# Patient Record
Sex: FEMALE | Race: WHITE | URBAN - METROPOLITAN AREA
[De-identification: names, ages, dates, MRNs, and addresses within clinical notes are randomized per-mention and may not be internally consistent; named-entity substitution may affect disease eponyms.]

---

## 2017-02-16 ENCOUNTER — IMPORTED ENCOUNTER (OUTPATIENT)
Dept: URBAN - METROPOLITAN AREA CLINIC 50 | Facility: CLINIC | Age: 77
End: 2017-02-16

## 2017-05-25 ENCOUNTER — IMPORTED ENCOUNTER (OUTPATIENT)
Dept: URBAN - METROPOLITAN AREA CLINIC 50 | Facility: CLINIC | Age: 77
End: 2017-05-25

## 2017-06-15 ENCOUNTER — IMPORTED ENCOUNTER (OUTPATIENT)
Dept: URBAN - METROPOLITAN AREA CLINIC 50 | Facility: CLINIC | Age: 77
End: 2017-06-15

## 2017-06-23 ENCOUNTER — IMPORTED ENCOUNTER (OUTPATIENT)
Dept: URBAN - METROPOLITAN AREA CLINIC 50 | Facility: CLINIC | Age: 77
End: 2017-06-23

## 2017-09-07 ENCOUNTER — IMPORTED ENCOUNTER (OUTPATIENT)
Dept: URBAN - METROPOLITAN AREA CLINIC 50 | Facility: CLINIC | Age: 77
End: 2017-09-07

## 2017-10-19 ENCOUNTER — IMPORTED ENCOUNTER (OUTPATIENT)
Dept: URBAN - METROPOLITAN AREA CLINIC 50 | Facility: CLINIC | Age: 77
End: 2017-10-19

## 2018-02-15 ENCOUNTER — IMPORTED ENCOUNTER (OUTPATIENT)
Dept: URBAN - METROPOLITAN AREA CLINIC 50 | Facility: CLINIC | Age: 78
End: 2018-02-15

## 2018-05-30 ENCOUNTER — IMPORTED ENCOUNTER (OUTPATIENT)
Dept: URBAN - METROPOLITAN AREA CLINIC 50 | Facility: CLINIC | Age: 78
End: 2018-05-30

## 2018-06-07 ENCOUNTER — IMPORTED ENCOUNTER (OUTPATIENT)
Dept: URBAN - METROPOLITAN AREA CLINIC 50 | Facility: CLINIC | Age: 78
End: 2018-06-07

## 2018-10-11 ENCOUNTER — IMPORTED ENCOUNTER (OUTPATIENT)
Dept: URBAN - METROPOLITAN AREA CLINIC 50 | Facility: CLINIC | Age: 78
End: 2018-10-11

## 2018-12-04 ENCOUNTER — IMPORTED ENCOUNTER (OUTPATIENT)
Dept: URBAN - METROPOLITAN AREA CLINIC 50 | Facility: CLINIC | Age: 78
End: 2018-12-04

## 2019-02-21 ENCOUNTER — IMPORTED ENCOUNTER (OUTPATIENT)
Dept: URBAN - METROPOLITAN AREA CLINIC 50 | Facility: CLINIC | Age: 79
End: 2019-02-21

## 2019-05-24 ENCOUNTER — IMPORTED ENCOUNTER (OUTPATIENT)
Dept: URBAN - METROPOLITAN AREA CLINIC 50 | Facility: CLINIC | Age: 79
End: 2019-05-24

## 2019-05-30 ENCOUNTER — IMPORTED ENCOUNTER (OUTPATIENT)
Dept: URBAN - METROPOLITAN AREA CLINIC 50 | Facility: CLINIC | Age: 79
End: 2019-05-30

## 2019-11-14 ENCOUNTER — IMPORTED ENCOUNTER (OUTPATIENT)
Dept: URBAN - METROPOLITAN AREA CLINIC 50 | Facility: CLINIC | Age: 79
End: 2019-11-14

## 2020-03-26 ENCOUNTER — IMPORTED ENCOUNTER (OUTPATIENT)
Dept: URBAN - METROPOLITAN AREA CLINIC 50 | Facility: CLINIC | Age: 80
End: 2020-03-26

## 2020-06-04 NOTE — PATIENT DISCUSSION
CATARACT, OU - VISUALLY SIGNIFICANT. SCHEDULE PHACO WITH IOL OD FIRST THEN  OS IF VISUAL SYMPTOMS PERSIST. GLASSES RX GIVEN TO FILL IF DESIRES IN THE EVENT PATIENT DOES NOT PROCEED WITH SURGERY. SPOKE WITH PT ABOUT RECOMMENDATION FOR AVT. EXPLAINED THAT SHE WOULD MOST LIKELY NOT BE A GOOD CANDIDATE FOR A MF IOL.

## 2020-06-26 ENCOUNTER — IMPORTED ENCOUNTER (OUTPATIENT)
Dept: URBAN - METROPOLITAN AREA CLINIC 50 | Facility: CLINIC | Age: 80
End: 2020-06-26

## 2020-07-02 ENCOUNTER — IMPORTED ENCOUNTER (OUTPATIENT)
Dept: URBAN - METROPOLITAN AREA CLINIC 50 | Facility: CLINIC | Age: 80
End: 2020-07-02

## 2020-07-21 NOTE — PATIENT DISCUSSION
New Prescription: Pred Forte (prednisolone acetate): drops,suspension: 1% 1 drop three times a day as directed into right eye 07-

## 2020-07-21 NOTE — PATIENT DISCUSSION
New Prescription: moxifloxacin (moxifloxacin): drops: 0.5% 1 drop three times a day as directed into right eye 07-

## 2020-07-21 NOTE — PATIENT DISCUSSION
New Prescription: Prolensa (bromfenac): drops: 0.07% 1 drop every morning as directed into right eye 07-

## 2020-07-29 NOTE — PATIENT DISCUSSION
Continue: Pred Forte (prednisolone acetate): drops,suspension: 1% 1 drop three times a day as directed into both eyes 07-

## 2020-07-29 NOTE — PATIENT DISCUSSION
Discontinue in right eye 8/5/20. Start 2 days prior to surgery in left eye and continue as directed.

## 2020-07-29 NOTE — PATIENT DISCUSSION
New Prescription: Laurita Hale (brinzolamide-brimonidine): drops,suspension: 1-0.2% 1 drop twice a day as directed into both eyes 07-

## 2020-07-29 NOTE — PATIENT DISCUSSION
*Pre-Op 2nd Eye Counseling: The patient has noticed an improvement in their visual symptoms in the operative eye. The patient complains of decreased vision in the fellow eye when watching TV and walking around. It was explained to the patient that the decision to proceed with cataract surgery in the fellow eye is entirely a separate decision from the surgical eye. All of the same risks, benefits and alternatives ere reviewed with the patient again. The patient does feel the vision in the non-operative eye is limiting their daily activities and elects to proceed with surgery in the cataract eye.

## 2020-07-29 NOTE — PATIENT DISCUSSION
Continue as directed in right eye. Start 2 days prior to surgery in left eye and continue as directed.

## 2020-07-29 NOTE — PATIENT DISCUSSION
Continue: moxifloxacin (moxifloxacin): drops: 0.5% 1 drop three times a day as directed into both eyes 07-

## 2020-07-29 NOTE — PATIENT DISCUSSION
S/P PHACO W/IOL,OD:  ELEVATED IOP. ADD SIMBRINZA TO CONTROL IOP BID OD X 7 DAYS. CONTINUE TO TAPER DROPS AS DIRECTED. DISCONTINUE ANTIBIOTIC IN 1 WEEK. RETURN IN 1 WEEK FOR AR/MR TO CHECK REFRACTION STABALIZATION PRIOR TO PROCEEDING WITH 2ND EYE. IF HAPPY WITH VISION, OK TO PROCEED.

## 2020-08-11 NOTE — PATIENT DISCUSSION
Continue: moxifloxacin (moxifloxacin): drops: 0.5% 1 drop three times a day as directed into left eye 07-

## 2020-08-11 NOTE — PATIENT DISCUSSION
Continue: Pred Forte (prednisolone acetate): drops,suspension: 1% 1 drop as directed into both eyes 07-

## 2020-08-19 NOTE — PATIENT DISCUSSION
S/P PHACO W/IOL,OS: FLUID RELEASED FROM AB EXTERNA INCISION AT SLIT LAMP WITHOUT DIFFICULTY. REPEAT IOP CHECK BY DR. PALMA (18 mmhg). INSTILLED ONE DROP OF ANTIBIOTIC IMMEDIATELY. ADD SIMBRINZA BID OS ONLY TO CONTROL IOP USE FOR 7 DAYS THEN D/C.  CONTINUE TO TAPER DROPS AS DIRECTED. DISCONTINUE ANTIBIOTIC DROP IN 1 WEEK. RETURN FOR FOLLOW-UP AS SCHEDULED.

## 2020-08-19 NOTE — PATIENT DISCUSSION
New Prescription: Manohar Baptiste (brinzolamide-brimonidine): drops,suspension: 1-0.2% 1 drop twice a day as directed into left eye 08-

## 2020-09-01 NOTE — PATIENT DISCUSSION
S/P PC IOL, OU. DOING WELL. CONTINUE DROPS AS DIRECTED. ATS PRN. SPECS RX OFFERED. RX ARC IN SPECS TO MINIMIZE GLARE. RETURN FOR FOLLOW-UP AS SCHEDULED.

## 2020-09-01 NOTE — PATIENT DISCUSSION
Continue: Pred Forte (prednisolone acetate): drops,suspension: 1% 1 drop as directed into left eye 07-

## 2020-09-01 NOTE — PATIENT DISCUSSION
Stopped Today: Simbrinza (brinzolamide-brimonidine): drops,suspension: 1-0.2% 1 drop twice a day as directed into left eye 08-

## 2020-09-08 ENCOUNTER — IMPORTED ENCOUNTER (OUTPATIENT)
Dept: URBAN - METROPOLITAN AREA CLINIC 50 | Facility: CLINIC | Age: 80
End: 2020-09-08

## 2020-11-05 ENCOUNTER — IMPORTED ENCOUNTER (OUTPATIENT)
Dept: URBAN - METROPOLITAN AREA CLINIC 50 | Facility: CLINIC | Age: 80
End: 2020-11-05

## 2020-11-05 NOTE — PATIENT DISCUSSION
"""IOP OU controlled with current drops. Will continue to monitor.  Continue Timolol 0.5% both eyes ""

## 2021-03-04 ENCOUNTER — IMPORTED ENCOUNTER (OUTPATIENT)
Dept: URBAN - METROPOLITAN AREA CLINIC 50 | Facility: CLINIC | Age: 81
End: 2021-03-04

## 2021-03-04 NOTE — PATIENT DISCUSSION
"""IOP controlled OU. Schedule HVF/OCT OU in June. "" ""Continue Timolol 0.5% both eyes twice a day . """

## 2021-04-18 ASSESSMENT — TONOMETRY
OS_IOP_MMHG: 14
OD_IOP_MMHG: 16
OS_IOP_MMHG: 14
OS_IOP_MMHG: 15
OD_IOP_MMHG: 15
OS_IOP_MMHG: 14
OS_IOP_MMHG: 15
OD_IOP_MMHG: 23
OS_IOP_MMHG: 14
OD_IOP_MMHG: 18
OD_IOP_MMHG: 15
OS_IOP_MMHG: 14
OS_IOP_MMHG: 12
OD_IOP_MMHG: 17
OD_IOP_MMHG: 16
OD_IOP_MMHG: 14
OD_IOP_MMHG: 14
OD_IOP_MMHG: 13
OS_IOP_MMHG: 18
OD_IOP_MMHG: 18
OD_IOP_MMHG: 14
OS_IOP_MMHG: 16
OD_IOP_MMHG: 16
OS_IOP_MMHG: 16
OD_IOP_MMHG: 16
OS_IOP_MMHG: 14

## 2021-04-18 ASSESSMENT — VISUAL ACUITY
OD_CC: 20/25
OS_CC: 20/30
OD_PH: @ 14 IN
OD_CC: 20/25+2
OD_CC: J3@ 14 IN
OS_PH: 20/60-1
OS_CC: J1+@ 16 IN
OS_PH: @ 14 IN
OS_CC: 20/40+
OS_CC: 20/30-
OD_CC: J2@ 18 IN
OS_CC: J1
OD_CC: J1+
OD_CC: J1
OD_CC: 20/20-1
OD_CC: 20/20-
OD_CC: 20/20-1
OD_CC: 20/20
OS_CC: J1+
OD_CC: 20/20
OS_CC: 20/30-2
OS_CC: 20/30-
OS_CC: 20/30
OS_CC: J2@ 18 IN
OD_CC: 20/25
OD_CC: 20/20
OS_CC: J1+
OS_CC: 20/30-1
OS_CC: 20/80-1
OD_CC: 20/25
OD_CC: 20/25
OS_CC: 20/25-
OD_CC: J1+
OD_CC: 20/20
OD_CC: 20/20
OS_CC: 20/20-
OS_CC: 20/25
OS_CC: J3@ 14 IN
OS_CC: 20/25-2
OS_CC: J3@ 14 IN
OD_CC: J3@ 14 IN
OD_CC: J1+@ 16 IN
OS_CC: 20/30+1
OS_CC: 20/30-2
OD_CC: 20/20

## 2021-06-23 ENCOUNTER — PREPPED CHART (OUTPATIENT)
Dept: URBAN - METROPOLITAN AREA CLINIC 52 | Facility: CLINIC | Age: 81
End: 2021-06-23

## 2021-06-25 ENCOUNTER — DIAGNOSTICS ONLY (OUTPATIENT)
Dept: URBAN - METROPOLITAN AREA CLINIC 52 | Facility: CLINIC | Age: 81
End: 2021-06-25

## 2021-06-25 DIAGNOSIS — H40.023: ICD-10-CM

## 2021-06-25 PROCEDURE — 92133 CPTRZD OPH DX IMG PST SGM ON: CPT

## 2021-06-25 PROCEDURE — 92083 EXTENDED VISUAL FIELD XM: CPT

## 2021-07-06 ENCOUNTER — PREPPED CHART (OUTPATIENT)
Dept: URBAN - METROPOLITAN AREA CLINIC 52 | Facility: CLINIC | Age: 81
End: 2021-07-06

## 2021-07-08 ENCOUNTER — 4 MONTH FOLLOW-UP (OUTPATIENT)
Dept: URBAN - METROPOLITAN AREA CLINIC 52 | Facility: CLINIC | Age: 81
End: 2021-07-08

## 2021-07-08 DIAGNOSIS — H40.023: ICD-10-CM

## 2021-07-08 PROCEDURE — 92012 INTRM OPH EXAM EST PATIENT: CPT

## 2021-07-08 ASSESSMENT — VISUAL ACUITY
OS_CC: 20/30-1
OU_CC: J1+ @ 14IN
OD_CC: 20/20
OU_CC: 20/20

## 2021-07-08 ASSESSMENT — TONOMETRY
OS_IOP_MMHG: 15
OD_IOP_MMHG: 17

## 2021-11-11 ENCOUNTER — 6 MONTH COMPREHENSIVE EXAM (OUTPATIENT)
Dept: URBAN - METROPOLITAN AREA CLINIC 52 | Facility: CLINIC | Age: 81
End: 2021-11-11

## 2021-11-11 DIAGNOSIS — H35.3131: ICD-10-CM

## 2021-11-11 DIAGNOSIS — H35.373: ICD-10-CM

## 2021-11-11 DIAGNOSIS — H40.023: ICD-10-CM

## 2021-11-11 PROCEDURE — 92014 COMPRE OPH EXAM EST PT 1/>: CPT

## 2021-11-11 PROCEDURE — 92134 CPTRZ OPH DX IMG PST SGM RTA: CPT

## 2021-11-11 ASSESSMENT — VISUAL ACUITY
OD_CC: 20/20
OS_CC: 20/30
OU_CC: J1+

## 2021-11-11 ASSESSMENT — TONOMETRY
OD_IOP_MMHG: 16
OS_IOP_MMHG: 16

## 2022-06-02 ENCOUNTER — FOLLOW UP (OUTPATIENT)
Dept: URBAN - METROPOLITAN AREA CLINIC 52 | Facility: CLINIC | Age: 82
End: 2022-06-02

## 2022-06-02 DIAGNOSIS — H40.023: ICD-10-CM

## 2022-06-02 PROCEDURE — 92012 INTRM OPH EXAM EST PATIENT: CPT

## 2022-06-02 ASSESSMENT — TONOMETRY
OS_IOP_MMHG: 13
OD_IOP_MMHG: 17

## 2022-06-02 ASSESSMENT — VISUAL ACUITY
OD_CC: 20/20
OS_CC: 20/40-2

## 2022-06-20 ENCOUNTER — DIAGNOSTICS ONLY (OUTPATIENT)
Dept: URBAN - METROPOLITAN AREA CLINIC 52 | Facility: CLINIC | Age: 82
End: 2022-06-20

## 2022-06-20 DIAGNOSIS — H40.023: ICD-10-CM

## 2022-06-20 PROCEDURE — 92083 EXTENDED VISUAL FIELD XM: CPT

## 2022-06-20 PROCEDURE — 92133 CPTRZD OPH DX IMG PST SGM ON: CPT

## 2022-10-06 ENCOUNTER — FOLLOW UP (OUTPATIENT)
Dept: URBAN - METROPOLITAN AREA CLINIC 52 | Facility: CLINIC | Age: 82
End: 2022-10-06

## 2022-10-06 DIAGNOSIS — H40.023: ICD-10-CM

## 2022-10-06 PROCEDURE — 92012 INTRM OPH EXAM EST PATIENT: CPT

## 2022-10-06 ASSESSMENT — VISUAL ACUITY
OD_CC: 20/40
OS_CC: 20/60

## 2022-10-06 ASSESSMENT — TONOMETRY
OS_IOP_MMHG: 16
OD_IOP_MMHG: 16

## 2023-10-26 ENCOUNTER — FOLLOW UP (OUTPATIENT)
Dept: URBAN - METROPOLITAN AREA CLINIC 52 | Facility: CLINIC | Age: 83
End: 2023-10-26

## 2023-10-26 DIAGNOSIS — H40.023: ICD-10-CM

## 2023-10-26 PROCEDURE — 76514 ECHO EXAM OF EYE THICKNESS: CPT

## 2023-10-26 PROCEDURE — 92083 EXTENDED VISUAL FIELD XM: CPT

## 2023-10-26 PROCEDURE — 92012 INTRM OPH EXAM EST PATIENT: CPT

## 2023-10-26 PROCEDURE — 92133 CPTRZD OPH DX IMG PST SGM ON: CPT

## 2023-10-26 ASSESSMENT — TONOMETRY
OS_IOP_MMHG: 14
OS_IOP_MMHG: 13
OD_IOP_MMHG: 23
OD_IOP_MMHG: 24

## 2023-10-26 ASSESSMENT — VISUAL ACUITY
OD_PH: 20/25
OD_CC: 20/30
OU_CC: 20/30
OS_CC: 20/40

## 2023-10-26 ASSESSMENT — PACHYMETRY
OD_CT_UM: 557
OS_CT_UM: 541

## 2024-01-25 ENCOUNTER — COMPREHENSIVE EXAM (OUTPATIENT)
Dept: URBAN - METROPOLITAN AREA CLINIC 52 | Facility: CLINIC | Age: 84
End: 2024-01-25

## 2024-01-25 DIAGNOSIS — H52.4: ICD-10-CM

## 2024-01-25 DIAGNOSIS — H35.3131: ICD-10-CM

## 2024-01-25 DIAGNOSIS — H02.831: ICD-10-CM

## 2024-01-25 DIAGNOSIS — H02.834: ICD-10-CM

## 2024-01-25 DIAGNOSIS — H35.373: ICD-10-CM

## 2024-01-25 DIAGNOSIS — H40.023: ICD-10-CM

## 2024-01-25 PROCEDURE — 92015 DETERMINE REFRACTIVE STATE: CPT

## 2024-01-25 PROCEDURE — 92014 COMPRE OPH EXAM EST PT 1/>: CPT

## 2024-01-25 PROCEDURE — 92134 CPTRZ OPH DX IMG PST SGM RTA: CPT

## 2024-01-25 ASSESSMENT — TONOMETRY
OD_IOP_MMHG: 15
OS_IOP_MMHG: 14
OD_IOP_MMHG: 14
OS_IOP_MMHG: 15

## 2024-01-25 ASSESSMENT — VISUAL ACUITY
OD_CC: 20/40
OD_PH: 20/30

## 2024-05-16 ENCOUNTER — FOLLOW UP (OUTPATIENT)
Dept: URBAN - METROPOLITAN AREA CLINIC 52 | Facility: CLINIC | Age: 84
End: 2024-05-16

## 2024-05-16 DIAGNOSIS — H40.023: ICD-10-CM

## 2024-05-16 PROCEDURE — 99213 OFFICE O/P EST LOW 20 MIN: CPT

## 2024-05-16 ASSESSMENT — TONOMETRY
OS_IOP_MMHG: 11
OD_IOP_MMHG: 13
OD_IOP_MMHG: 12
OS_IOP_MMHG: 12

## 2024-05-16 ASSESSMENT — VISUAL ACUITY
OS_CC: 20/30-1
OD_CC: 20/20

## 2024-09-19 ENCOUNTER — FOLLOW UP (OUTPATIENT)
Dept: URBAN - METROPOLITAN AREA CLINIC 52 | Facility: CLINIC | Age: 84
End: 2024-09-19

## 2024-09-19 DIAGNOSIS — H40.023: ICD-10-CM

## 2024-09-19 PROCEDURE — 99213 OFFICE O/P EST LOW 20 MIN: CPT

## 2025-01-24 ENCOUNTER — DIAGNOSTICS ONLY (OUTPATIENT)
Age: 85
End: 2025-01-24

## 2025-01-24 DIAGNOSIS — H40.023: ICD-10-CM

## 2025-01-24 PROCEDURE — 92083 EXTENDED VISUAL FIELD XM: CPT

## 2025-01-24 PROCEDURE — 92133 CPTRZD OPH DX IMG PST SGM ON: CPT

## 2025-01-30 ENCOUNTER — COMPREHENSIVE EXAM (OUTPATIENT)
Age: 85
End: 2025-01-30

## 2025-01-30 DIAGNOSIS — H02.834: ICD-10-CM

## 2025-01-30 DIAGNOSIS — H52.4: ICD-10-CM

## 2025-01-30 DIAGNOSIS — H40.023: ICD-10-CM

## 2025-01-30 DIAGNOSIS — H16.223: ICD-10-CM

## 2025-01-30 DIAGNOSIS — H02.831: ICD-10-CM

## 2025-01-30 DIAGNOSIS — H35.373: ICD-10-CM

## 2025-01-30 DIAGNOSIS — H35.3131: ICD-10-CM

## 2025-01-30 PROCEDURE — 99214 OFFICE O/P EST MOD 30 MIN: CPT

## 2025-01-30 PROCEDURE — 92015 DETERMINE REFRACTIVE STATE: CPT

## 2025-01-30 PROCEDURE — 92134 CPTRZ OPH DX IMG PST SGM RTA: CPT

## 2025-05-21 ENCOUNTER — FOLLOW UP (OUTPATIENT)
Age: 85
End: 2025-05-21

## 2025-05-21 DIAGNOSIS — H35.373: ICD-10-CM

## 2025-05-21 DIAGNOSIS — H52.4: ICD-10-CM

## 2025-05-21 DIAGNOSIS — H02.831: ICD-10-CM

## 2025-05-21 DIAGNOSIS — Z98.890: ICD-10-CM

## 2025-05-21 DIAGNOSIS — H40.023: ICD-10-CM

## 2025-05-21 DIAGNOSIS — H35.3131: ICD-10-CM

## 2025-05-21 DIAGNOSIS — Z96.1: ICD-10-CM

## 2025-05-21 DIAGNOSIS — H16.223: ICD-10-CM

## 2025-05-21 DIAGNOSIS — H02.834: ICD-10-CM

## 2025-05-21 PROCEDURE — 99214 OFFICE O/P EST MOD 30 MIN: CPT

## 2025-05-21 PROCEDURE — 92134 CPTRZ OPH DX IMG PST SGM RTA: CPT
